# Patient Record
Sex: FEMALE | Race: WHITE
[De-identification: names, ages, dates, MRNs, and addresses within clinical notes are randomized per-mention and may not be internally consistent; named-entity substitution may affect disease eponyms.]

---

## 2017-12-02 ENCOUNTER — HOSPITAL ENCOUNTER (EMERGENCY)
Dept: HOSPITAL 59 - ER | Age: 57
Discharge: HOME | End: 2017-12-02
Payer: COMMERCIAL

## 2017-12-02 DIAGNOSIS — Z87.442: ICD-10-CM

## 2017-12-02 DIAGNOSIS — N13.2: Primary | ICD-10-CM

## 2017-12-02 LAB
ANION GAP SERPL CALC-SCNC: 13 MMOL/L (ref 7–16)
APPEARANCE UR: CLEAR
BILIRUB UR-MCNC: NEGATIVE MG/DL
BUN SERPL-MCNC: 25 MG/DL (ref 6–20)
CO2 SERPL-SCNC: 26 MMOL/L (ref 22–29)
COLOR UR: YELLOW
CREAT SERPL-MCNC: 0.7 MG/DL (ref 0.5–0.9)
EOSINOPHIL NFR BLD: 1 % (ref 0–6)
ERYTHROCYTE [DISTWIDTH] IN BLOOD BY AUTOMATED COUNT: 14 % (ref 11.5–14.5)
EST GLOMERULAR FILTRATION RATE: > 60 ML/MIN
GLUCOSE SERPL-MCNC: 131 MG/DL (ref 74–109)
GLUCOSE UR STRIP-MCNC: NEGATIVE MG/DL
HCT VFR BLD CALC: 42.4 % (ref 35–47)
HGB BLD-MCNC: 13.9 GM/DL (ref 11.6–16)
KETONES UR QL STRIP: NEGATIVE
LYMPHOCYTES NFR BLD: 61 % (ref 16–45)
MCH RBC QN AUTO: 29.4 PG (ref 27–33)
MCHC RBC AUTO-ENTMCNC: 32.8 G/DL (ref 32–36)
MCV RBC AUTO: 89.6 FL (ref 81–97)
MONOCYTES NFR BLD: 3 % (ref 0–9)
NEUTROPHILS NFR BLD AUTO: 35 % (ref 47–80)
NITRITE UR QL STRIP: NEGATIVE
PLATELET # BLD: 405 K/UL (ref 130–400)
PMV BLD AUTO: 9.5 FL (ref 7.4–10.4)
PROT UR QL STRIP: NEGATIVE
RBC # BLD AUTO: 4.73 M/UL (ref 3.8–5.4)
RBC # UR STRIP: (no result) /UL
URINE LEUKOCYTE ESTERASE: NEGATIVE
UROBILINOGEN UR STRIP-ACNC: 0.2 E.U./DL (ref 0.2–1)
WBC # BLD AUTO: 6.9 K/UL (ref 4.2–12.2)
WBC #/AREA URNS HPF: (no result) /[HPF]

## 2017-12-02 PROCEDURE — 96374 THER/PROPH/DIAG INJ IV PUSH: CPT

## 2017-12-02 PROCEDURE — 96375 TX/PRO/DX INJ NEW DRUG ADDON: CPT

## 2017-12-02 PROCEDURE — 74176 CT ABD & PELVIS W/O CONTRAST: CPT

## 2017-12-02 PROCEDURE — 99284 EMERGENCY DEPT VISIT MOD MDM: CPT

## 2017-12-02 PROCEDURE — 80048 BASIC METABOLIC PNL TOTAL CA: CPT

## 2017-12-02 PROCEDURE — 85027 COMPLETE CBC AUTOMATED: CPT

## 2017-12-02 PROCEDURE — 81001 URINALYSIS AUTO W/SCOPE: CPT

## 2017-12-02 NOTE — CT SCAN REPORT
EXAM:  CT SCAN ABDOMEN/PELVIS WO CONTRAST 



HISTORY:  ACUTE LEFT LOWER QUADRANT ABDOMINAL PAIN. 



TECHNIQUE:  Contiguous axial images from the lung bases through the symphysis 
pubis were obtained without IV contrast. 



COMPARISON: Abdomen and pelvis CT, 07/30/2012.  



FINDINGS: Lung bases are clear. The liver and spleen are unremarkable. Moderate 
left hydronephrosis due to partially obstructing calculus at the left 
ureterovesical junction measuring 3 mm. Additional nonobstructing 2 mm calculus
, mid left kidney. Nonobstructing 1 mm calculus, mid right kidney, as well as 
the lower right kidney respectively. Multiple unilocular cysts in the right 
kidney; largest in the interpolar region measuring 2.7 cm. Adrenals and 
pancreas are normal. Tiny dependent stones in the gallbladder. 



Status post Casimiro-en-Y gastric bypass surgery. Visualized loops of small and 
large bowel are of normal caliber. Moderate fecal material throughout the 
colon. The appendix is not seen with certainty although there are no pericecal 
inflammatory changes. 



No free intraperitoneal fluid or adenopathy. Mild calcification of the 
abdominal aorta without aneurysm. Abdominal wall is unremarkable. Urinary 
bladder is unremarkable. 



No lytic or blastic lesion. 



IMPRESSION:  



1. MODERATE LEFT HYDRONEPHROSIS WITH PARTIALLY OBSTRUCTING 3 MM CALCULUS AT THE 
LEFT URETEROVESICAL JUNCTION. ADDITIONAL NONOBSTRUCTING INTRARENAL CALCULI.

2. BENIGN BOSNIAK TYPE 1 RIGHT RENAL CYST.

3. CHOLELITHIASIS. 

4. STATUS POST CASIMIRO-EN-Y GASTRIC BYPASS. 



JOB NUMBER: [id number]
MTDD

## 2017-12-02 NOTE — EMERGENCY DEPARTMENT RECORD
History of Present Illness





- General


Chief complaint: Flank Pain


Stated complaint: KIDNEY STONE


Time Seen by Provider: 12/02/17 01:51


Source: Patient


Mode of Arrival: Ambulatory


Limitations: No limitations





- History of Present Illness


Initial comments: 


56 yo female presents with flank pain on and off for 5 days.  The pain is over 

the left flank to the left groin.  No fever or chills.  No retention.  No 

hematuria.  She reports having several prior stones in the past.  She has never 

passed a stone without lithotripsy or a stent.  Her last stone was about 4 

years ago.  Her urologist is doctor Pierre.





-: Days(s) (5)


Location: LLQ


Radiation: L flank


Severity: Moderate


Quality: Aching, Burning


Consistency: Intermittent


Improves with: None


Worsens with: None


Patient Pregnant: No


Associated Symptoms: Denies other symptoms





- Related Data


 Home Medications











 Medication  Instructions  Recorded  Confirmed  Last Taken


 


Aspirin [Aspir-Low] 81 mg PO DAILY 12/02/17 12/02/17 12/01/17


 


Cholecalciferol (Vitamin D3) 5,000 unit PO DAILY 12/02/17 12/02/17 12/01/17





[Vitamin D3]    


 


Cyanocobalamin (Vitamin B-12) 500 mcg PO DAILY 12/02/17 12/02/17 12/01/17





[Vitamin B12]    


 


Fenofibrate Nanocrystallized 145 mg PO DAILY 12/02/17 12/02/17 12/01/17





[Tricor]    








 Previous Rx's











 Medication  Instructions  Recorded


 


Hydrocodone/Acetaminophen [Norco 1 each PO Q8H #15 tablet 12/02/17





7.5-325 Tablet]  


 


Tamsulosin HCl [Flomax] 0.4 mg PO DAILY #10 cap.er.24h 12/02/17











 Allergies











Allergy/AdvReac Type Severity Reaction Status Date / Time


 


steroid eye drop Allergy  SWELLING Uncoded 07/23/15 15:47





   OF THE  





   TONGUE  














Review of Systems


Constitutional: Denies: Chills, Fever, Malaise, Weakness


Eyes: Denies: Eye discharge


ENT: Denies: Congestion, Throat pain


Respiratory: Denies: Cough, Dyspnea, Hemoptysis, Stridor, Wheezes


Cardiovascular: Denies: Chest pain, Syncope


Endocrine: Denies: Fatigue


Gastrointestinal: Reports: As per HPI, Abdominal pain.  Denies: Diarrhea, Nausea

, Vomiting


Genitourinary: Denies: Dysuria, Frequency, Hematuria, Urgency


Musculoskeletal: Reports: As per HPI, Back pain


Skin: Denies: Bruising, Change in color, Rash


Neurological: Denies: Headache, Numbness, Weakness


Psychiatric: Denies: Anxiety


Hematological/Lymphatic: Denies: Blood Clots, Easy bleeding, Easy bruising, 

Swollen glands





Past Medical History





- SOCIAL HISTORY


Smoking Status: Former smoker





- RESPIRATORY


Hx Respiratory Disorders: No





- CARDIOVASCULAR


Hx Cardio Disorders: Yes


Hx Hypotension: Yes





- NEURO


Hx Neuro Disorders: No


Hx of Migraines: Yes (with spinal for delivery child)





- GI


Hx GI Disorders: No





- 


Hx Genitourinary Disorders: Yes


Hx Renal Disease: No


Comment:: hyst 1991





- ENDOCRINE


Hx Endocrine Disorders: Yes


Comment:: sugars range 120-150 in am/ A1C 6.7 July 2015





- MUSCULOSKELETAL


Hx Musculoskeletal Disorders: Yes


Hx Arthritis: Yes (R hip)





- PSYCH


Hx Psych Problems: No





- HEMATOLOGY/ONCOLOGY


Hx Hematology/Oncology Disorders: No


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





Family Medical History


Hx Cancer: Mother


Hx Diabetes: Father


Hx Resp Disorders: Father





Physical Exam





- General


General Appearance: Alert, Oriented x3, Cooperative, No acute distress


Limitations: No limitations





- Head


Head exam: Normal inspection





- Eye


Eye exam: Normal appearance, PERRL.  negative: Conjunctival injection, 

Periorbital swelling





- ENT


ENT exam: Normal exam, Mucous membranes moist


Ear exam: Normal external inspection


Nasal Exam: Normal inspection


Mouth exam: Normal external inspection





- Neck


Neck exam: Normal inspection, Full ROM.  negative: Tenderness





- Respiratory


Respiratory exam: Normal lung sounds bilaterally.  negative: Respiratory 

distress





- Cardiovascular


Cardiovascular Exam: Regular rate, Normal rhythm, Normal heart sounds





- GI/Abdominal


GI/Abdominal exam: Soft.  negative: Tenderness





- Rectal


Rectal exam: Deferred





- 


 exam: Deferred





- Extremities


Extremities exam: Normal inspection, Full ROM, Normal capillary refill.  

negative: Pedal edema, Tenderness





- Back


Back exam: Reports: Normal inspection, Full ROM.  Denies: CVA tenderness (R), 

CVA tenderness (L), Muscle spasm, Rash noted, Tenderness





- Neurological


Neurological exam: Alert, Normal gait, Oriented X3, Reflexes normal





- Psychiatric


Psychiatric exam: Normal affect, Normal mood





- Skin


Skin exam: Dry, Intact, Normal color, Warm





Course





- Reevaluation(s)


Reevaluation #1: 


EMR reviewed.


No prior reports or imaging of the abdomen 





The vitals were reviewed


No acute changes.


12/02/17 02:01





12/02/17 02:14


No acute changes on the CBC


12/02/17 02:35


The BMP was reviewed


No acute changes


12/02/17 02:42





The CT scan demonstrates a 4mm stone in the proximal left ureter at the UPJ.  

Moderate hydro. Cholelithiasis.  


12/02/17 02:48


The patient reports her pain is well controlled


With the size under 5mm I explained she might pass this stone.  She was given 

Flomax.  She has taken this prior.  Her UA is negative for Nitrite and LE.  She 

was given a copy of her CT.  She is to be seen this weekend if her pain returns

, is uncontrolled, any fever or vomiting.  She is to call Dr Pierre first of 

the week for close follow up as well.  No bacterial in the UA or WBC's.


12/02/17 02:57








Medical Decision Making





- Lab Data


Result diagrams: 


 12/02/17 01:55





 12/02/17 01:55





Disposition


Disposition: Discharge


Clinical Impression: 


 Renal calculus, left





Disposition: Home, Self-Care


Condition: (1) Good


Instructions:  Renal Colic (ED)


Additional Instructions: 


Go to an ER immediately if you have uncontrolled pain, any fever, vomiting or 

unable to urinate


Call Dr Pierre on Monday if the stone has not passed


Strain your urine to try to visualize the stone





Prescriptions: 


Hydrocodone/Acetaminophen [Norco 7.5-325 Tablet] 1 each PO Q8H #15 tablet


Tamsulosin HCl [Flomax] 0.4 mg PO DAILY #10 cap.er.24h


Forms:  Patient Portal Access


Time of Disposition: 02:53





Quality





- Quality Measures


Quality Measures: N/A





- Blood Pressure Screening


Does Patient Have Any of the Following: No


Blood Pressure Classification: Pre-Hypertensive BP Reading


Systolic Measurement: 134


Diastolic Measurement: 79


Screening for High Blood Pressure: < Pre-Hypertensive BP, F/U Documented > [

]


Pre-Hypertensive Follow-up Interventions: Referral to alternative/primary care 

provider.

## 2018-11-30 ENCOUNTER — HOSPITAL ENCOUNTER (EMERGENCY)
Dept: HOSPITAL 59 - ER | Age: 58
Discharge: HOME | End: 2018-11-30
Payer: COMMERCIAL

## 2018-11-30 DIAGNOSIS — I10: ICD-10-CM

## 2018-11-30 DIAGNOSIS — M79.674: ICD-10-CM

## 2018-11-30 DIAGNOSIS — L97.519: Primary | ICD-10-CM

## 2018-11-30 DIAGNOSIS — Z87.891: ICD-10-CM

## 2018-11-30 PROCEDURE — 99282 EMERGENCY DEPT VISIT SF MDM: CPT

## 2018-11-30 NOTE — EMERGENCY DEPARTMENT RECORD
History of Present Illness





- General


Chief complaint: Extremity Problem


Stated complaint: TOE ON RT FOOT IN INFECTED


Time Seen by Provider: 18 14:19


Source: Patient


Mode of Arrival: Ambulatory


Limitations: No limitations





- History of Present Illness


Initial comments: 


The patient has a sore on her R 2nd toe for a month and it is not healing. She 

believes she need an abx. There is no trauma, injury, or foot pain.





MD Complaint: Extremity pain


Onset/Timin


-: Month(s)


Location: Right, Foot


History of Same: No


Severity scale (1-10): 3


Quality: Other


Consistency: Constant


Improves with: Nothing


Worsens with: Nothing


Associated Symptoms: Denies other symptoms





- Related Data


 Previous Rx's











 Medication  Instructions  Recorded


 


Cephalexin [Keflex] 500 mg PO TID #21 cap 18











 Allergies











Allergy/AdvReac Type Severity Reaction Status Date / Time


 


steroid eye drop Allergy  SWELLING Uncoded 07/23/15 15:47





   OF THE  





   TONGUE  














Travel Screening





- Travel/Exposure Within Last 30 Days


Have you traveled within the last 30 days?: No





Review of Systems


Constitutional: Denies: Chills, Fever





Past Medical History





- SOCIAL HISTORY


Smoking Status: Former smoker





- RESPIRATORY


Hx Respiratory Disorders: No





- CARDIOVASCULAR


Hx Cardio Disorders: Yes


Hx Hypotension: Yes





- NEURO


Hx Neuro Disorders: No


Hx of Migraines: Yes (with spinal for delivery child)





- GI


Hx GI Disorders: No





- 


Hx Genitourinary Disorders: Yes


Hx Renal Disease: No


Comment:: hyst 





- ENDOCRINE


Hx Endocrine Disorders: Yes


Comment:: sugars range 120-150 in am/ A1C 6.2015





- MUSCULOSKELETAL


Hx Musculoskeletal Disorders: Yes


Hx Arthritis: Yes (R hip)





- PSYCH


Hx Psych Problems: No





- HEMATOLOGY/ONCOLOGY


Hx Hematology/Oncology Disorders: No


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





Family Medical History


Any Significant Family History?: Yes


Hx Cancer: Mother


Hx Diabetes: Father


Hx Resp Disorders: Father





Physical Exam





- General


General Appearance: Alert, Cooperative, No acute distress





- Head


Head exam: Atraumatic, Normocephalic





- Eye


Eye exam: Normal appearance





- Neck


Neck exam: Normal inspection





- Extremities


Extremities exam: Full ROM, Tenderness, Other (The area of erythema measures 

5x5 mm. ).  negative: Normal inspection (There is a very superficial ulcer with 

very slight erythema surrounding the dorsal R 2nd toe over the distal phalynx. 

There is no toe swelling proximally or streaking erythema. The R foot is non 

tender. ), Joint swelling





- Neurological


Neurological exam: Alert.  negative: Motor sensory deficit





Course





 Vital Signs











  18





  14:16


 


Temperature 98.1 F


 


Pulse Rate 82


 


Respiratory 20





Rate 


 


Blood Pressure 120/80


 


Pulse Ox 99














- Reevaluation(s)


Reevaluation #1: 


I did discuss the need for an oral Abx and a referral to Dr. Aviles and the 

patient agrees with the plan.


18 14:25








Disposition


Disposition: Discharge


Clinical Impression: 


 Toe pain, right





Disposition: Home, Self-Care


Condition: (2) Stable


Instructions:  Blister (ED)


Additional Instructions: 


Please dress with Abx ointment and take the Keflex. Please see Dr. Aviles in 

the Family Practice clinic next week. Return to the ER for any worsening 

symptoms.


Prescriptions: 


Cephalexin [Keflex] 500 mg PO TID #21 cap


Referrals: 


[facility] [Provider Group]


CYNDI AVILES, D.P.M. [DOCTOR OF PODIATRY MEDICINE] - 


Forms:  Patient Portal Access





Quality





- Quality Measures


Quality Measures: N/A





- Blood Pressure Screening


View Details: Yes


Does Patient Have Any of the Following: No


Blood Pressure Classification: Pre-Hypertensive BP Reading


Systolic Measurement: 120


Diastolic Measurement: 80


Screening for High Blood Pressure: < Pre-Hypertensive BP, F/U Documented > [

]


Pre-Hypertensive Follow-up Interventions: Referral to alternative/primary care 

provider.

## 2019-08-20 ENCOUNTER — HOSPITAL ENCOUNTER (EMERGENCY)
Dept: HOSPITAL 59 - ER | Age: 59
Discharge: HOME | End: 2019-08-20
Payer: COMMERCIAL

## 2019-08-20 DIAGNOSIS — Z87.891: ICD-10-CM

## 2019-08-20 DIAGNOSIS — J01.90: Primary | ICD-10-CM

## 2019-08-20 DIAGNOSIS — R05: ICD-10-CM

## 2019-08-20 PROCEDURE — 99283 EMERGENCY DEPT VISIT LOW MDM: CPT

## 2019-08-20 NOTE — EMERGENCY DEPARTMENT RECORD
History of Present Illness





- General


Chief complaint: Cold


Stated complaint: SINUS CONGESTION


Time Seen by Provider: 08/20/19 16:01


Source: Patient


Mode of Arrival: Ambulatory


Limitations: No limitations





- History of Present Illness


Initial comments: 





58 yo female presents with congestion.  this has been present for 6 weeks with 

left frontal and left maxillary tenderness.  She is minimally able to breath out

of her left nostril.  No blood.  No fever.  She has minimal dry cough.  No prior

ENT surgery.  No dizziness.  No vision changes.  She has some ear fullness.  No 

neck pain or adenopathy. Dr Navarro is her PCP.  She was unable to make an 

appointment until September 9. 


MD complaint: Other


-: Week(s) (6)


Location: Nose


Severity: Moderate


Quality: Aching


Consistency: Constant


Improves with: None


Worsens with: Other (Blowing her nose)


Associated Symptoms: Cough, Rhinorrhea





- Related Data


                                Home Medications











 Medication  Instructions  Recorded  Confirmed  Last Taken


 


Hydrochlorothiazide [Hctz] 25 mg PO DAILY 08/20/19 08/20/19 Unknown








                                  Previous Rx's











 Medication  Instructions  Recorded


 


Amoxicillin 500Mg Capsule [Amoxil] 500 mg PO TID #30 tab 08/20/19


 


Cetirizine HCl [Zyrtec] 10 mg PO DAILY #30 cap 08/20/19


 


Fluticasone Propionate [Flonase] 2 spray EACH NARES DAILY #1 bottle 08/20/19











                                    Allergies











Allergy/AdvReac Type Severity Reaction Status Date / Time


 


steroid eye drop Allergy  SWELLING Uncoded 07/23/15 15:47





   OF THE  





   TONGUE  














Review of Systems


Constitutional: Denies: Chills, Fever, Malaise, Weakness


Eyes: Denies: Eye discharge


ENT: Reports: Congestion, Ear pain.  Denies: Dental pain, Epistaxis, Throat pain


Respiratory: Reports: Cough.  Denies: Dyspnea, Hemoptysis, Stridor, Wheezes


Cardiovascular: Denies: Chest pain, Palpitations, Syncope


Endocrine: Denies: Fatigue


Gastrointestinal: Denies: Abdominal pain, Diarrhea, Nausea, Vomiting


Genitourinary: Denies: Dysuria, Urgency


Musculoskeletal: Denies: Arthralgia, Back pain, Joint swelling, Myalgia


Skin: Denies: Bruising, Change in color, Rash


Neurological: Reports: Headache (sinus area on left).  Denies: Numbness, 

Weakness


Psychiatric: Denies: Anxiety


Hematological/Lymphatic: Denies: Easy bleeding, Easy bruising





Past Medical History





- SOCIAL HISTORY


Smoking Status: Former smoker





- RESPIRATORY


Hx Respiratory Disorders: No





- CARDIOVASCULAR


Hx Cardio Disorders: Yes


Hx Hypotension: Yes





- NEURO


Hx Neuro Disorders: No


Hx of Migraines: Yes (with spinal for delivery child)





- GI


Hx GI Disorders: No





- 


Hx Genitourinary Disorders: Yes


Hx Renal Disease: No


Comment:: hyst 1991





- ENDOCRINE


Hx Endocrine Disorders: Yes


Comment:: sugars range 120-150 in am/ A1C 6.7 July 2015





- MUSCULOSKELETAL


Hx Musculoskeletal Disorders: Yes


Hx Arthritis: Yes (R hip)





- PSYCH


Hx Psych Problems: No





- HEMATOLOGY/ONCOLOGY


Hx Hematology/Oncology Disorders: No


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





Family Medical History


Hx Cancer: Mother


Hx Diabetes: Father


Hx Resp Disorders: Father





Physical Exam





- General


General Appearance: Alert, Oriented x3, Cooperative, No acute distress


Limitations: No limitations





- Head


Head exam: Atraumatic, Normal inspection





- Eye


Eye exam: Normal appearance, PERRL.  negative: Conjunctival injection, Scleral 

icterus





- ENT


ENT exam: Mucous membranes moist, Normal orophraynx, TM's normal bilaterally.  

negative: Mucous membranes dry


Ear exam: Normal external inspection


Nasal Exam: Discharge (occluded left nasal cavity, clear discharge), Sinus 

tenderness (left frontal and maxillary).  negative: Active bleeding, Dried blood


Mouth exam: Normal external inspection


Teeth exam: Normal inspection


Throat exam: Normal inspection





- Neck


Neck exam: Normal inspection





- Respiratory


Respiratory exam: Normal lung sounds bilaterally.  negative: Rhonchi, Stridor, 

Wheezes





- Cardiovascular


Cardiovascular Exam: Regular rate, Normal rhythm, Normal heart sounds





- Neurological


Neurological exam: Alert, Normal gait, Oriented X3.  negative: Altered





- Psychiatric


Psychiatric exam: Normal affect, Normal mood





- Skin


Skin exam: Dry, Intact, Normal color, Warm





Course





- Reevaluation(s)


Reevaluation #1: 





08/20/19 18:31


The examination is consistent with sinusitis


We discussed treatment plan at home and follow up with the PCP





Disposition


Disposition: Discharge


Clinical Impression: 


 Sinusitis





Disposition: Home, Self-Care


Condition: (1) Good


Instructions:  Sinusitis (ED)


Additional Instructions: 


Call your doctor for the next available follow up appointment


Review this ER visit and the tests performed with your family doctor


Return to the ER for a recheck if worse, any new concerns or questions


Take the prescriptions provided as directed


Prescriptions: 


Amoxicillin 500Mg Capsule [Amoxil] 500 mg PO TID #30 tab


Fluticasone Propionate [Flonase] 2 spray EACH NARES DAILY #1 bottle


Cetirizine HCl [Zyrtec] 10 mg PO DAILY #30 cap


Forms:  Patient Portal Access


Time of Disposition: 16:25





Quality





- Quality Measures


Quality Measures: N/A





- Blood Pressure Screening


Does Patient Have Any of the Following: No


Blood Pressure Classification: Hypertensive Reading


Systolic Measurement: 149


Diastolic Measurement: 90


Screening for High Blood Pressure: < Pre-Hypertensive BP, F/U Documented > 

[]


Pre-Hypertensive Follow-up Interventions: Referral to alternative/primary care 

provider.